# Patient Record
Sex: FEMALE | Race: BLACK OR AFRICAN AMERICAN | Employment: FULL TIME | URBAN - METROPOLITAN AREA
[De-identification: names, ages, dates, MRNs, and addresses within clinical notes are randomized per-mention and may not be internally consistent; named-entity substitution may affect disease eponyms.]

---

## 2017-09-10 ENCOUNTER — HOSPITAL ENCOUNTER (EMERGENCY)
Age: 27
Discharge: HOME | End: 2017-09-10
Attending: EMERGENCY MEDICINE | Admitting: EMERGENCY MEDICINE
Payer: SELF-PAY

## 2017-09-10 VITALS
TEMPERATURE: 98.4 F | WEIGHT: 270 LBS | BODY MASS INDEX: 49.69 KG/M2 | SYSTOLIC BLOOD PRESSURE: 181 MMHG | DIASTOLIC BLOOD PRESSURE: 99 MMHG | OXYGEN SATURATION: 99 % | HEART RATE: 84 BPM | HEIGHT: 62 IN | RESPIRATION RATE: 16 BRPM

## 2017-09-10 DIAGNOSIS — K08.89 DENTALGIA: ICD-10-CM

## 2017-09-10 DIAGNOSIS — R51.9 RIGHT FACIAL PAIN: Primary | ICD-10-CM

## 2017-09-10 PROCEDURE — 99282 EMERGENCY DEPT VISIT SF MDM: CPT

## 2017-09-10 RX ORDER — LEVETIRACETAM 250 MG/1
TABLET ORAL 2 TIMES DAILY
COMMUNITY
End: 2017-12-12

## 2017-09-10 RX ORDER — PENICILLIN V POTASSIUM 500 MG/1
500 TABLET, FILM COATED ORAL 4 TIMES DAILY
Qty: 28 TAB | Refills: 0 | Status: SHIPPED | OUTPATIENT
Start: 2017-09-10 | End: 2017-09-17

## 2017-09-10 RX ORDER — ACETAMINOPHEN AND CODEINE PHOSPHATE 300; 30 MG/1; MG/1
1 TABLET ORAL
Qty: 10 TAB | Refills: 0 | Status: SHIPPED | OUTPATIENT
Start: 2017-09-10 | End: 2017-12-12

## 2017-09-11 NOTE — ED NOTES
Pt discharged home stable and ambulatory. Pain level at discharge unchanged. Pt discharged with self. Reviewed discharged instructions with patient who verbalized understanding.   Patient armband removed and shredded

## 2017-09-11 NOTE — DISCHARGE INSTRUCTIONS
Tooth and Gum Pain: Care Instructions  Your Care Instructions    The most common causes of dental pain are tooth decay and gum disease. Pain can also be caused by an infection of the tooth (abscess) or the gums. Or you may have pain from a broken or cracked tooth. Other causes of pain include infection and damage to a tooth from nervous grinding of your teeth. A wisdom tooth can be painful when it is coming in but cannot break through the gum. It can also be painful when the tooth is only partway in and extra gum tissue has formed around it. The tissue can get inflamed (pericoronitis), and sometimes it gets infected. Prompt dental care can help find the cause of your toothache and keep the tooth from dying or gum disease from getting worse. Self-care at home may reduce your pain and discomfort. Follow-up care is a key part of your treatment and safety. Be sure to make and go to all appointments, and call your dentist or doctor if you are having problems. It's also a good idea to know your test results and keep a list of the medicines you take. How can you care for yourself at home? · To reduce pain and facial swelling, put an ice or cold pack on the outside of your cheek for 10 to 20 minutes at a time. Put a thin cloth between the ice and your skin. Do not use heat. · If your doctor prescribed antibiotics, take them as directed. Do not stop taking them just because you feel better. You need to take the full course of antibiotics. · Ask your doctor if you can take an over-the-counter pain medicine, such as acetaminophen (Tylenol), ibuprofen (Advil, Motrin), or naproxen (Aleve). Be safe with medicines. Read and follow all instructions on the label. · Avoid very hot, cold, or sweet foods and drinks if they increase your pain. · Rinse your mouth with warm salt water every 2 hours to help relieve pain and swelling. Mix 1 teaspoon of salt in 8 ounces of water.   · Talk to your dentist about using special toothpaste for sensitive teeth. To reduce pain on contact with heat or cold or when brushing, brush with this toothpaste regularly or rub a small amount of the paste on the sensitive area with a clean finger 2 or 3 times a day. Floss gently between your teeth. · Do not smoke or use spit tobacco. Tobacco use can make gum problems worse, decreases your ability to fight infection in your gums, and delays healing. If you need help quitting, talk to your doctor about stop-smoking programs and medicines. These can increase your chances of quitting for good. When should you call for help? Call 911 anytime you think you may need emergency care. For example, call if:  · You have trouble breathing. Call your dentist or doctor now or seek immediate medical care if:  · You have signs of infection, such as:  ¨ Increased pain, swelling, warmth, or redness. ¨ Red streaks leading from the area. ¨ Pus draining from the area. ¨ A fever. Watch closely for changes in your health, and be sure to contact your doctor if:  · You do not get better as expected. Where can you learn more? Go to http://baironTouchstormyamel.info/. Enter [id number] [id number] in the search box to learn more about \"Tooth and Gum Pain: Care Instructions. \"  Current as of: August 11, 2016  Content Version: 11.3  © 5408-5341 [employer]. Care instructions adapted under license by [employer] (which disclaims liability or warranty for this information). If you have questions about a medical condition or this instruction, always ask your healthcare professional. [place] [address] any warranty or liability for your use of this information. Learning About Dental Care  What is basic dental care? Basic dental care involves brushing and flossing your teeth regularly to remove plaque. Plaque is a thin film of bacteria that sticks to teeth above and below the gum line.  It can build up and harden into tartar, which makes it harder to give the teeth a good cleaning. Tartar usually has to be removed by a dental hygienist.  The bacteria in plaque use sugars to make acids. These acids can damage the gums and teeth. Be sure to see your dentist and dental hygienist for regular checkups and cleanings. How can dental care affect your health? Practicing basic dental care:  · Removes plaque that can cause gum disease and tooth decay. Tooth decay can lead to a hole in the tooth (cavity). · Helps prevent bad breath. Brushing and flossing rid your mouth of the bacteria that cause bad breath. · Helps keep teeth white by preventing staining from food, drinks, and tobacco.  · Makes it possible for your teeth to last a lifetime. What can you do to prevent dental problems? · Brush your teeth twice a day, in the morning and at night. ¨ Use a toothbrush with soft, rounded-end bristles and a head that is small enough to reach all parts of your teeth and mouth. ¨ Replace your toothbrush every 3 to 4 months. ¨ Use a fluoride toothpaste. ¨ Place the brush at a 45-degree angle where the teeth meet the gums. Press firmly, and gently rock the brush back and forth using small circular movements. ¨ Brush chewing surfaces vigorously with short back-and-forth strokes. ¨ Brush your tongue from back to front. · Floss at least once a day. Choose the type and flavor you like best.  · Schedule checkups and cleanings as often as your dentist recommends it. · Eat a healthy diet to help keep your gums healthy and your teeth strong. Choose foods that are good for your teeth, such as whole grains, vegetables, fruits, and foods that are low in saturated fat and sodium. Mozzarella and other cheeses, peanuts, yogurt, and milk are good for your teeth. Sugar-free chewing gum (especially gum that contains xylitol) is also a good choice. · Avoid foods that contain a lot of sugar, especially sticky, sweet foods like taffy. · Don't snack before bedtime.  Food left on the teeth is more likely to cause tooth decay overnight. · Don't smoke or use smokeless tobacco. Tobacco can make tooth decay worse. If you need help quitting, talk to your doctor about stop-smoking programs and medicines. These can increase your chances of quitting for good. Where can you learn more? Go to http://bairon-yamel.info/. Enter I503 in the search box to learn more about \"Learning About Dental Care. \"  Current as of: August 9, 2016  Content Version: 11.3  © 3821-9511 [employer]. Care instructions adapted under license by [employer] (which disclaims liability or warranty for this information). If you have questions about a medical condition or this instruction, always ask your healthcare professional. [address] any warranty or liability for your use of this information.

## 2017-09-11 NOTE — ED PROVIDER NOTES
HPI Comments:   11:03 PM    Nela Sena is a 32 y.o. female PMHx hypothyroidism, anemia presents to ED C/O upper right dental pain, onset a few weeks. Associated symptoms include swelling and headache. Taking ibuprofen 600 mg and tylenol 1000 mg for pain. Pt endorses smoking. Pt states allergy to hydrocodone is nausea. Pt did not take blood pressure medication today be denies chest pain and SOB. Pt denies fevers and any other Sx or complaints. Able to swallow and open mouth. The history is provided by the patient. No  was used. Past Medical History:   Diagnosis Date    Anemia     Headache     Hypertension     Hypothyroidism     Seizure [facility])        Past Surgical History:   Procedure Laterality Date    HX ORTHOPAEDIC      right meniscus repair         History reviewed. No pertinent family history. Social History     Social History    Marital status:      Spouse name: N/A    Number of children: N/A    Years of education: N/A     Occupational History    Not on file. Social History Main Topics    Smoking status: Current Every Day Smoker     Packs/day: 1.00    Smokeless tobacco: Not on file    Alcohol use No    Drug use: Not on file    Sexual activity: Yes     Birth control/ protection: None     Other Topics Concern    Not on file     Social History Narrative         ALLERGIES: Hydrochlorothiazide and Tramadol    Review of Systems   Constitutional: Negative for fever. HENT: Positive for dental problem. Respiratory: Negative for shortness of breath. Cardiovascular: Negative for chest pain. Neurological: Positive for headaches (secondary to dental pain). All other systems reviewed and are negative.       Vitals:    09/10/17 2137 09/10/17 2324   BP: (!) 173/121 (!) 181/99   Pulse: 98 84   Resp: 14 16   Temp: 98.4 °F (36.9 °C)    SpO2: 100% 99%   Weight: 122.5 kg (270 lb)    Height: 5' 2\" (1.575 m)             Physical Exam   Constitutional: She is oriented to person, place, and time. She appears well-developed and well-nourished. No distress. Alert, non toxic, appears slightly uncomfortable   HENT:   Head: Normocephalic and atraumatic. Right Ear: Tympanic membrane, external ear and ear canal normal.   Left Ear: Tympanic membrane, external ear and ear canal normal.   Nose: Nose normal.   Mouth/Throat: Uvula is midline, oropharynx is clear and moist and mucous membranes are normal. Mucous membranes are not dry. Abnormal dentition. Dental caries present. No dental abscesses. No oropharyngeal exudate, posterior oropharyngeal edema, posterior oropharyngeal erythema or tonsillar abscesses. No sublingual tenderness or swelling  no swelling, erythema, induration, fluctuance to the surrounding gingiva, no drainage  Able to swallow secretions  Able to open mouth fully  No trismus   No obvious facial swelling    Neck: Normal range of motion. Neck supple. Cardiovascular: Normal rate, regular rhythm and normal heart sounds. Pulmonary/Chest: Effort normal and breath sounds normal. No stridor. No respiratory distress. She has no wheezes. She has no rales. Lymphadenopathy:     She has no cervical adenopathy. Neurological: She is alert and oriented to person, place, and time. Skin: Skin is warm and dry. She is not diaphoretic. Psychiatric: She has a normal mood and affect. Judgment normal.   Nursing note and vitals reviewed. RESULTS:    PULSE OXIMETRY NOTE:  Pulse-ox is 100% on room air  Interpretation: normal       No orders to display        Labs Reviewed - No data to display    No results found for this or any previous visit (from the past 12 hour(s)).     MDM  Number of Diagnoses or Management Options  Dentalgia:   Right facial pain:   Diagnosis management comments: Fox Granado, no evidence of abscess  + hx of HTN, has not taken her BP meds today, has rx at home, denies CP, SOB, HA, blurred vision     ED Course     Medications - No data to display    Procedures    PROGRESS NOTE:  11:03 PM  Initial assessment performed. Written by Pricilla King ED Scribe, as dictated by Britt Blackman PA-C     DISCHARGE NOTE:  11:16PM  Kavya Ribeiro's  results have been reviewed with her. She has been counseled regarding her diagnosis, treatment, and plan. She verbally conveys understanding and agreement of the signs, symptoms, diagnosis, treatment and prognosis and additionally agrees to follow up as discussed. She also agrees with the care-plan and conveys that all of her questions have been answered. I have also provided discharge instructions for her that include: educational information regarding their diagnosis and treatment, and list of reasons why they would want to return to the ED prior to their follow-up appointment, should her condition change. Proper ED utilization discussed with the pt. CLINICAL IMPRESSION:    1. Right facial pain    2. Dentalgia        PLAN: DISCHARGE HOME    Follow-up Information     Follow up With Details Comments [id number] Call in 1 day  [address] [place] [facility] [address] [place] [address] EMERGENCY DEPT Go to As needed, If symptoms worsen [address] [place] [id number]  [phone]          Discharge Medication List as of 9/10/2017 11:16 PM      START taking these medications    Details   penicillin v potassium (VEETID) 500 mg tablet Take 1 Tab by mouth four (4) times daily for 7 days. , Print, Disp-28 Tab, R-0      acetaminophen-codeine (TYLENOL-CODEINE #3) 300-30 mg per tablet Take 1 Tab by mouth every four (4) hours as needed for Pain. Max Daily Amount: 6 Tabs., Print, Disp-10 Tab, R-0         CONTINUE these medications which have NOT CHANGED    Details   levETIRAcetam (KEPPRA) 250 mg tablet Take  by mouth two (2) times a day., Historical Med      propranolol LA (INDERAL LA) 80 mg SR capsule Take 40 mg by mouth two (2) times a day. , Historical Med      levothyroxine (SYNTHROID) 200 mcg tablet Take  by mouth Daily (before breakfast). , Historical Med      ferrous sulfate (IRON) 325 mg (65 mg iron) tablet Take  by mouth Daily (before breakfast). , Historical Med             ATTESTATIONS:  This note is prepared by Navid Kaufman, acting as Scribe for Alda Alvarado PA-C. Alda Alvarado PA-C: The scribe's documentation has been prepared under my direction and personally reviewed by me in its entirety. I confirm that the note above accurately reflects all work, treatment, procedures, and medical decision making performed by me.

## 2017-09-11 NOTE — ED TRIAGE NOTES
Pain in right upper jaw for a few weeks. Does not have dentist or dental insurance. Pt BP elevated at triage. Did not take HTN medication today.

## 2017-10-13 ENCOUNTER — HOSPITAL ENCOUNTER (EMERGENCY)
Age: 27
Discharge: HOME | End: 2017-10-13
Attending: EMERGENCY MEDICINE
Payer: SELF-PAY

## 2017-10-13 VITALS
HEART RATE: 104 BPM | TEMPERATURE: 98.2 F | WEIGHT: 270 LBS | HEIGHT: 62 IN | SYSTOLIC BLOOD PRESSURE: 156 MMHG | OXYGEN SATURATION: 100 % | DIASTOLIC BLOOD PRESSURE: 108 MMHG | BODY MASS INDEX: 49.69 KG/M2 | RESPIRATION RATE: 20 BRPM

## 2017-10-13 DIAGNOSIS — K08.89 PAIN, DENTAL: ICD-10-CM

## 2017-10-13 DIAGNOSIS — R51.9 RIGHT FACIAL PAIN: Primary | ICD-10-CM

## 2017-10-13 PROCEDURE — 99281 EMR DPT VST MAYX REQ PHY/QHP: CPT

## 2017-10-13 RX ORDER — IBUPROFEN 800 MG/1
800 TABLET ORAL
Qty: 20 TAB | Refills: 0 | Status: SHIPPED | OUTPATIENT
Start: 2017-10-13 | End: 2017-10-20

## 2017-10-13 NOTE — DISCHARGE INSTRUCTIONS
Dental Pain: After Your Visit  Your Care Instructions  The most common cause of dental pain is tooth decay. It can also be caused by an infection of the tooth (abscess) or gum, a tooth that has not broken all the way through the gum (impacted tooth), or a problem with the nerve-filled center of the tooth. Follow-up care is a key part of your treatment and safety. Be sure to make and go to all appointments, and call your doctor if you are having problems. Its also a good idea to know your test results and keep a list of the medicines you take. How can you care for yourself at home? · Contact a dentist for follow-up care. · Put ice or a cold pack on the outside of your mouth for 10 to 20 minutes at a time to reduce pain and swelling. Put a thin cloth between the ice and your skin. · Take an over-the-counter pain medicine, such as acetaminophen (Tylenol), ibuprofen (Advil, Motrin), or naproxen (Aleve). Read and follow all instructions on the label. · Do not take two or more pain medicines at the same time unless the doctor told you to. Many pain medicines have acetaminophen, which is Tylenol. Too much acetaminophen (Tylenol) can be harmful. · Rinse your mouth with warm salt water every 2 hours to help relieve pain and swelling from an infected tooth. Mix 1 teaspoon of salt in 8 ounces of water. · If your doctor prescribed antibiotics, take them as directed. Do not stop taking them just because you feel better. You need to take the full course of antibiotics. When should you call for help? Call your doctor now or seek immediate medical care if:  · You have signs of infection, such as:  ¨ Increased pain, swelling, warmth, or redness. ¨ Pus draining from the gum, tooth, or face. ¨ A fever. Watch closely for changes in your health, and be sure to contact your doctor if:  · You do not get better as expected. Where can you learn more?    Go to [employer]  Enter P964 in the search box to learn more about \"Dental Pain: After Your Visit. \"   © 7799-4845 [employer]. Care instructions adapted under license by Roxana Poon (which disclaims liability or warranty for this information). This care instruction is for use with your licensed healthcare professional. If you have questions about a medical condition or this instruction, always ask your healthcare professional. [address]  any warranty or liability for your use of this information. Content Version: 57.9.739143;  Last Revised: May 17, 2013

## 2017-10-13 NOTE — ED TRIAGE NOTES
Patient with complaints of right upper dental pain for over 1 week. Patient states that she has not taken her bp meds today.

## 2017-10-13 NOTE — ED PROVIDER NOTES
HPI Comments: 2:57 PM  Joshua Jerry is a 32 y.o. female with PMhx of HTN, seizure disorder, hypothyroidism and anemia presenting to the ED C/O upper right dental pain onset 3-4 weeks ago. No associated sxs. Pain is exacerbated when eating and drinking. Pt reports use of Tylenol without relief. Pt states she cannot get a dental appointment until November. Pt was seen at this facility 2 days ago for same, d'xed with dentalgia, and prescribed Keppra. Pt reports she stopped taking her antibiotics because they \"gave her a yeast infection. \" Pt denies fever, chills, and any other symptoms or complaints at this time. The history is provided by the patient. No  was used. Past Medical History:   Diagnosis Date    Anemia     Headache(784.0)     Hypertension     Hypothyroidism     Seizure ([address]        Past Surgical History:   Procedure Laterality Date    HX ORTHOPAEDIC      right meniscus repair         History reviewed. No pertinent family history. Social History     Social History    Marital status:      Spouse name: N/A    Number of children: N/A    Years of education: N/A     Occupational History    Not on file. Social History Main Topics    Smoking status: Current Every Day Smoker     Packs/day: 1.00    Smokeless tobacco: Never Used    Alcohol use No    Drug use: Not on file    Sexual activity: Yes     Birth control/ protection: None     Other Topics Concern    Not on file     Social History Narrative         ALLERGIES: Hydrochlorothiazide and Tramadol    Review of Systems   Constitutional: Negative for chills and fever. HENT: Positive for dental problem (upper right). Negative for mouth sores. All other systems reviewed and are negative.       Vitals:    10/13/17 1434   BP: (!) 156/108   Pulse: (!) 104   Resp: 20   Temp: 98.2 °F (36.8 °C)   SpO2: 100%   Weight: 122.5 kg (270 lb)   Height: 5' 2\" (1.575 m)            Physical Exam   Constitutional: She is oriented to person, place, and time. She appears well-developed and well-nourished. No distress. Alert, well appearing, non toxic    HENT:   Head: Normocephalic and atraumatic. Right Ear: Tympanic membrane, external ear and ear canal normal.   Left Ear: Tympanic membrane, external ear and ear canal normal.   Nose: Nose normal.   Mouth/Throat: Uvula is midline, oropharynx is clear and moist and mucous membranes are normal. Mucous membranes are not dry. Abnormal dentition. No oropharyngeal exudate, posterior oropharyngeal edema, posterior oropharyngeal erythema or tonsillar abscesses. No sublingual tenderness or swelling  no swelling, erythema, induration, fluctuance to the surrounding gingiva, no drainage  Able to swallow secretions  Able to open mouth fully  No trismus   No obvious facial swelling    Neck: Normal range of motion. Neck supple. Cardiovascular: Normal rate, regular rhythm, normal heart sounds and intact distal pulses. No murmur heard. Pulmonary/Chest: Effort normal and breath sounds normal. No stridor. No respiratory distress. She has no wheezes. She has no rales. Lymphadenopathy:     She has no cervical adenopathy. Neurological: She is alert and oriented to person, place, and time. Skin: Skin is warm and dry. She is not diaphoretic. Psychiatric: She has a normal mood and affect. Judgment normal.   Nursing note and vitals reviewed. RESULTS:    PULSE OXIMETRY NOTE:  Pulse-ox is 100% on RA  Interpretation: Normal       No orders to display        Labs Reviewed - No data to display    No results found for this or any previous visit (from the past 12 hour(s)).     MDM  Number of Diagnoses or Management Options  Pain, dental:   Right facial pain:   Diagnosis management comments: No evidence of infection will have pt f/u with dentist   Recently treated with abx for dental pain     ED Course     MEDICATIONS GIVEN:  Medications - No data to display    Procedures    PROGRESS NOTE:  2:57 PM  Initial assessment performed. Recorded by Terra Araiza ED Scribe, as dictated by Bailee Viera PA-C. Discharge Note:  3:12 PM  Angel Luis Ribeiro's results have been reviewed with her and/or her family. She has been counseled regarding her diagnosis, treatment, and plan. She verbally conveys understanding and agreement of the signs, symptoms, diagnosis, treatment and prognosis and additionally agrees to follow up as discussed. She also agrees with the care-plan and conveys that all of her questions have been answered. I have also provided discharge instructions for her that include: educational information regarding the diagnosis and treatment, and a list of reasons why She would want to return to the ED prior to her follow-up appointment, should her condition change. Proper ED utilization discussed with the patient. CLINICAL IMPRESSION    1. Right facial pain    2. Pain, dental        After visit plan:  D/c home    Discharge Medication List as of 10/13/2017  3:12 PM      START taking these medications    Details   ibuprofen (MOTRIN) 800 mg tablet Take 1 Tab by mouth every six (6) hours as needed for Pain for up to 7 days. , Print, Disp-20 Tab, R-0         CONTINUE these medications which have NOT CHANGED    Details   levETIRAcetam (KEPPRA) 250 mg tablet Take  by mouth two (2) times a day., Historical Med      acetaminophen-codeine (TYLENOL-CODEINE #3) 300-30 mg per tablet Take 1 Tab by mouth every four (4) hours as needed for Pain. Max Daily Amount: 6 Tabs., Print, Disp-10 Tab, R-0      propranolol LA (INDERAL LA) 80 mg SR capsule Take 40 mg by mouth two (2) times a day., Historical Med      levothyroxine (SYNTHROID) 200 mcg tablet Take  by mouth Daily (before breakfast). , Historical Med      ferrous sulfate (IRON) 325 mg (65 mg iron) tablet Take  by mouth Daily (before breakfast). , Historical Med             Follow-up Information     Follow up With Details Comments Contact Info    Dentist Go to Go to a dentist for follow up. [facility] EMERGENCY DEPT Go to As needed, If symptoms worsen [address] [place] [address]  [phone]          ____________________    This note is prepared by Opal Whiteside, acting as Scribe for Maria Del Carmen Solo PA-C. Maria Del Carmen Solo PA-C: The scribe's documentation has been prepared under my direction and personally reviewed by me in its entirety. I confirm that the note above accurately reflects all work, treatment, procedures, and medical decision making performed by me.

## 2017-10-13 NOTE — ED NOTES
Upon attempting to discharge patient. Patient not in room after discussion with provider. Patient left without discharge instructions.

## 2017-12-12 ENCOUNTER — HOSPITAL ENCOUNTER (EMERGENCY)
Age: 27
Discharge: HOME | End: 2017-12-12
Attending: EMERGENCY MEDICINE
Payer: SELF-PAY

## 2017-12-12 ENCOUNTER — APPOINTMENT (OUTPATIENT)
Dept: CT IMAGING | Age: 27
End: 2017-12-12
Attending: PHYSICIAN ASSISTANT
Payer: SELF-PAY

## 2017-12-12 VITALS
HEIGHT: 62 IN | SYSTOLIC BLOOD PRESSURE: 154 MMHG | HEART RATE: 74 BPM | BODY MASS INDEX: 49.69 KG/M2 | RESPIRATION RATE: 18 BRPM | TEMPERATURE: 99.1 F | DIASTOLIC BLOOD PRESSURE: 113 MMHG | WEIGHT: 270 LBS | OXYGEN SATURATION: 100 %

## 2017-12-12 DIAGNOSIS — R10.9 RIGHT FLANK PAIN: Primary | ICD-10-CM

## 2017-12-12 DIAGNOSIS — R31.9 HEMATURIA, UNSPECIFIED TYPE: ICD-10-CM

## 2017-12-12 LAB
ALBUMIN SERPL-MCNC: 3.8 G/DL (ref 3.4–5)
ALBUMIN/GLOB SERPL: 0.8 {RATIO} (ref 0.8–1.7)
ALP SERPL-CCNC: 80 U/L (ref 45–117)
ALT SERPL-CCNC: 27 U/L (ref 13–56)
ANION GAP SERPL CALC-SCNC: 8 MMOL/L (ref 3–18)
APPEARANCE UR: ABNORMAL
AST SERPL-CCNC: 10 U/L (ref 15–37)
BACTERIA URNS QL MICRO: ABNORMAL /HPF
BASOPHILS # BLD: 0 K/UL (ref 0–0.06)
BASOPHILS NFR BLD: 0 % (ref 0–2)
BILIRUB SERPL-MCNC: 0.1 MG/DL (ref 0.2–1)
BILIRUB UR QL: NEGATIVE
BUN SERPL-MCNC: 14 MG/DL (ref 7–18)
BUN/CREAT SERPL: 15 (ref 12–20)
CALCIUM SERPL-MCNC: 9.2 MG/DL (ref 8.5–10.1)
CHLORIDE SERPL-SCNC: 104 MMOL/L (ref 100–108)
CO2 SERPL-SCNC: 29 MMOL/L (ref 21–32)
COLOR UR: YELLOW
CREAT SERPL-MCNC: 0.91 MG/DL (ref 0.6–1.3)
DIFFERENTIAL METHOD BLD: ABNORMAL
EOSINOPHIL # BLD: 0.2 K/UL (ref 0–0.4)
EOSINOPHIL NFR BLD: 3 % (ref 0–5)
EPITH CASTS URNS QL MICRO: ABNORMAL /LPF (ref 0–5)
ERYTHROCYTE [DISTWIDTH] IN BLOOD BY AUTOMATED COUNT: 13.8 % (ref 11.6–14.5)
GLOBULIN SER CALC-MCNC: 4.6 G/DL (ref 2–4)
GLUCOSE SERPL-MCNC: 97 MG/DL (ref 74–99)
GLUCOSE UR STRIP.AUTO-MCNC: NEGATIVE MG/DL
HCG UR QL: NEGATIVE
HCT VFR BLD AUTO: 35.7 % (ref 35–45)
HGB BLD-MCNC: 11.8 G/DL (ref 12–16)
HGB UR QL STRIP: ABNORMAL
KETONES UR QL STRIP.AUTO: NEGATIVE MG/DL
LEUKOCYTE ESTERASE UR QL STRIP.AUTO: NEGATIVE
LYMPHOCYTES # BLD: 2.2 K/UL (ref 0.9–3.6)
LYMPHOCYTES NFR BLD: 27 % (ref 21–52)
MCH RBC QN AUTO: 29.6 PG (ref 24–34)
MCHC RBC AUTO-ENTMCNC: 33.1 G/DL (ref 31–37)
MCV RBC AUTO: 89.7 FL (ref 74–97)
MONOCYTES # BLD: 0.6 K/UL (ref 0.05–1.2)
MONOCYTES NFR BLD: 8 % (ref 3–10)
NEUTS SEG # BLD: 5.1 K/UL (ref 1.8–8)
NEUTS SEG NFR BLD: 62 % (ref 40–73)
NITRITE UR QL STRIP.AUTO: NEGATIVE
PH UR STRIP: 6.5 [PH] (ref 5–8)
PLATELET # BLD AUTO: 291 K/UL (ref 135–420)
PMV BLD AUTO: 10.7 FL (ref 9.2–11.8)
POTASSIUM SERPL-SCNC: 3.7 MMOL/L (ref 3.5–5.5)
PROT SERPL-MCNC: 8.4 G/DL (ref 6.4–8.2)
PROT UR STRIP-MCNC: ABNORMAL MG/DL
RBC # BLD AUTO: 3.98 M/UL (ref 4.2–5.3)
RBC #/AREA URNS HPF: ABNORMAL /HPF (ref 0–5)
SODIUM SERPL-SCNC: 141 MMOL/L (ref 136–145)
SP GR UR REFRACTOMETRY: 1.02 (ref 1–1.03)
UROBILINOGEN UR QL STRIP.AUTO: 0.2 EU/DL (ref 0.2–1)
WBC # BLD AUTO: 8.2 K/UL (ref 4.6–13.2)
WBC URNS QL MICRO: ABNORMAL /HPF (ref 0–5)

## 2017-12-12 PROCEDURE — 74011250636 HC RX REV CODE- 250/636: Performed by: PHYSICIAN ASSISTANT

## 2017-12-12 PROCEDURE — 96374 THER/PROPH/DIAG INJ IV PUSH: CPT

## 2017-12-12 PROCEDURE — 96375 TX/PRO/DX INJ NEW DRUG ADDON: CPT

## 2017-12-12 PROCEDURE — 99283 EMERGENCY DEPT VISIT LOW MDM: CPT

## 2017-12-12 PROCEDURE — 85025 COMPLETE CBC W/AUTO DIFF WBC: CPT | Performed by: PHYSICIAN ASSISTANT

## 2017-12-12 PROCEDURE — 81001 URINALYSIS AUTO W/SCOPE: CPT | Performed by: EMERGENCY MEDICINE

## 2017-12-12 PROCEDURE — 81025 URINE PREGNANCY TEST: CPT | Performed by: PHYSICIAN ASSISTANT

## 2017-12-12 PROCEDURE — 80053 COMPREHEN METABOLIC PANEL: CPT | Performed by: PHYSICIAN ASSISTANT

## 2017-12-12 PROCEDURE — 74176 CT ABD & PELVIS W/O CONTRAST: CPT

## 2017-12-12 RX ORDER — ONDANSETRON 2 MG/ML
4 INJECTION INTRAMUSCULAR; INTRAVENOUS
Status: COMPLETED | OUTPATIENT
Start: 2017-12-12 | End: 2017-12-12

## 2017-12-12 RX ORDER — ONDANSETRON 4 MG/1
4 TABLET, FILM COATED ORAL
Qty: 15 TAB | Refills: 0 | Status: SHIPPED | OUTPATIENT
Start: 2017-12-12 | End: 2018-04-14

## 2017-12-12 RX ORDER — CHOLECALCIFEROL (VITAMIN D3) 125 MCG
CAPSULE ORAL
COMMUNITY

## 2017-12-12 RX ORDER — KETOROLAC TROMETHAMINE 10 MG/1
10 TABLET, FILM COATED ORAL
Qty: 20 TAB | Refills: 0 | Status: SHIPPED | OUTPATIENT
Start: 2017-12-12 | End: 2018-04-14

## 2017-12-12 RX ORDER — KETOROLAC TROMETHAMINE 30 MG/ML
30 INJECTION, SOLUTION INTRAMUSCULAR; INTRAVENOUS
Status: COMPLETED | OUTPATIENT
Start: 2017-12-12 | End: 2017-12-12

## 2017-12-12 RX ADMIN — KETOROLAC TROMETHAMINE 30 MG: 30 INJECTION, SOLUTION INTRAMUSCULAR at 22:02

## 2017-12-12 RX ADMIN — ONDANSETRON 4 MG: 2 INJECTION INTRAMUSCULAR; INTRAVENOUS at 22:02

## 2017-12-13 NOTE — ED TRIAGE NOTES
Bilateral flank pain and vomiting with blood in urine. Began this am. Sepsis Screening completed    (  )Patient meets SIRS criteria. ( x )Patient does not meet SIRS criteria.       SIRS Criteria is achieved when two or more of the following are present   Temperature < 96.8°F (36°C) or > 100.9°F (38.3°C)   Heart Rate > 90 beats per minute   Respiratory Rate > 20 breaths per minute   WBC count > 12,000 or <4,000 or > 10% bands

## 2017-12-13 NOTE — ED PROVIDER NOTES
EMERGENCY DEPARTMENT HISTORY AND PHYSICAL EXAM    Date: 12/12/2017  Patient Name: John Ribeiro    History of Presenting Illness     Chief Complaint   Patient presents with    Flank Pain         History Provided By: Patient    Chief Complaint: bilateral flank pain  Duration: 12 Hours  Timing:  Gradual  Location: bilateral flank  Associated Symptoms: decreased urination and dysuria    Additional History (Context):   9:31 PM  Maryse Harris is a 32 y.o. female with PMHX of kidney stones in 2013 who presents to the emergency department C/O bilateral flank pain onset this morning. Associated sxs include decreased urination and dysuria. Pt reports this is similar to the pain she has when she had kidney stones. LBM was yesterday. LMP was 1 week ago. Pt took ibuprofen for pain relief. Pt denies diarrhea, vomiting, fever, vaginal discharge, and any other sxs or complaints. PCP: Gladys Gusman MD    Current Outpatient Prescriptions   Medication Sig Dispense Refill    cholecalciferol, vitamin D3, (VITAMIN D3) 2,000 unit tab Take  by mouth.  ondansetron hcl (ZOFRAN, AS HYDROCHLORIDE,) 4 mg tablet Take 1 Tab by mouth every eight (8) hours as needed for Nausea. 15 Tab 0    ketorolac (TORADOL) 10 mg tablet Take 1 Tab by mouth every six (6) hours as needed for Pain. 20 Tab 0    propranolol LA (INDERAL LA) 80 mg SR capsule Take 40 mg by mouth two (2) times a day.  levothyroxine (SYNTHROID) 200 mcg tablet Take  by mouth Daily (before breakfast).  ferrous sulfate (IRON) 325 mg (65 mg iron) tablet Take  by mouth Daily (before breakfast). Past History     Past Medical History:  Past Medical History:   Diagnosis Date    Anemia     Headache(784.0)     Hypertension     Hypothyroidism     Seizure ([address]        Past Surgical History:  Past Surgical History:   Procedure Laterality Date    HX ORTHOPAEDIC      right meniscus repair       Family History:  History reviewed.  No pertinent family history. Social History:  Social History   Substance Use Topics    Smoking status: Current Every Day Smoker     Packs/day: 1.00    Smokeless tobacco: Never Used    Alcohol use No       Allergies: Allergies   Allergen Reactions    Hydrochlorothiazide Swelling    Tramadol Rash         Review of Systems   Review of Systems   Constitutional: Negative for fever. Gastrointestinal: Negative for diarrhea and vomiting. Genitourinary: Positive for decreased urine volume, dysuria and flank pain. Negative for vaginal discharge. All other systems reviewed and are negative. Physical Exam     Vitals:    12/12/17 2014 12/12/17 2245   BP: (!) 196/132 (!) 154/113   Pulse: 94 74   Resp: 16 18   Temp: 99.1 °F (37.3 °C)    SpO2: 100% 100%   Weight: 122.5 kg (270 lb)    Height: 5' 2\" (1.575 m)      Physical Exam   Constitutional: She is oriented to person, place, and time. She appears well-developed and well-nourished. Alert, obese, non toxic   HENT:   Head: Normocephalic and atraumatic. Neck: Normal range of motion. Neck supple. Cardiovascular: Normal rate, regular rhythm, normal heart sounds and intact distal pulses. No murmur heard. Pulmonary/Chest: Effort normal and breath sounds normal. No respiratory distress. She has no wheezes. She has no rales. Abdominal: Soft. Bowel sounds are normal. She exhibits no distension. There is no hepatosplenomegaly. There is tenderness. There is CVA tenderness (right ). There is no rigidity, no rebound and no guarding. Neurological: She is alert and oriented to person, place, and time. Skin: Skin is warm and dry. No rash noted. No erythema. No pallor. Psychiatric: She has a normal mood and affect. Judgment normal.   Nursing note and vitals reviewed.         Diagnostic Study Results     Labs -     Recent Results (from the past 12 hour(s))   URINALYSIS W/ RFLX MICROSCOPIC    Collection Time: 12/12/17  8:40 PM   Result Value Ref Range    Color YELLOW      Appearance CLOUDY      Specific gravity 1.022 1.005 - 1.030      pH (UA) 6.5 5.0 - 8.0      Protein TRACE (A) NEG mg/dL    Glucose NEGATIVE  NEG mg/dL    Ketone NEGATIVE  NEG mg/dL    Bilirubin NEGATIVE  NEG      Blood LARGE (A) NEG      Urobilinogen 0.2 0.2 - 1.0 EU/dL    Nitrites NEGATIVE  NEG      Leukocyte Esterase NEGATIVE  NEG     HCG URINE, QL    Collection Time: 12/12/17  8:40 PM   Result Value Ref Range    HCG urine, Ql. NEGATIVE  NEG     URINE MICROSCOPIC ONLY    Collection Time: 12/12/17  8:40 PM   Result Value Ref Range    WBC 0 to 3 0 - 5 /hpf    RBC 61 to 70 0 - 5 /hpf    Epithelial cells 1+ 0 - 5 /lpf    Bacteria 1+ (A) NEG /hpf   CBC WITH AUTOMATED DIFF    Collection Time: 12/12/17  9:40 PM   Result Value Ref Range    WBC 8.2 4.6 - 13.2 K/uL    RBC 3.98 (L) 4.20 - 5.30 M/uL    HGB 11.8 (L) 12.0 - 16.0 g/dL    HCT 35.7 35.0 - 45.0 %    MCV 89.7 74.0 - 97.0 FL    MCH 29.6 24.0 - 34.0 PG    MCHC 33.1 31.0 - 37.0 g/dL    RDW 13.8 11.6 - 14.5 %    PLATELET 347 109 - 197 K/uL    MPV 10.7 9.2 - 11.8 FL    NEUTROPHILS 62 40 - 73 %    LYMPHOCYTES 27 21 - 52 %    MONOCYTES 8 3 - 10 %    EOSINOPHILS 3 0 - 5 %    BASOPHILS 0 0 - 2 %    ABS. NEUTROPHILS 5.1 1.8 - 8.0 K/UL    ABS. LYMPHOCYTES 2.2 0.9 - 3.6 K/UL    ABS. MONOCYTES 0.6 0.05 - 1.2 K/UL    ABS. EOSINOPHILS 0.2 0.0 - 0.4 K/UL    ABS.  BASOPHILS 0.0 0.0 - 0.06 K/UL    DF AUTOMATED     METABOLIC PANEL, COMPREHENSIVE    Collection Time: 12/12/17  9:40 PM   Result Value Ref Range    Sodium 141 136 - 145 mmol/L    Potassium 3.7 3.5 - 5.5 mmol/L    Chloride 104 100 - 108 mmol/L    CO2 29 21 - 32 mmol/L    Anion gap 8 3.0 - 18 mmol/L    Glucose 97 74 - 99 mg/dL    BUN 14 7.0 - 18 MG/DL    Creatinine 0.91 0.6 - 1.3 MG/DL    BUN/Creatinine ratio 15 12 - 20      GFR est AA >60 >60 ml/min/1.73m2    GFR est non-AA >60 >60 ml/min/1.73m2    Calcium 9.2 8.5 - 10.1 MG/DL    Bilirubin, total 0.1 (L) 0.2 - 1.0 MG/DL    ALT (SGPT) 27 13 - 56 U/L    AST (SGOT) 10 (L) 15 - 37 U/L Alk. phosphatase 80 45 - 117 U/L    Protein, total 8.4 (H) 6.4 - 8.2 g/dL    Albumin 3.8 3.4 - 5.0 g/dL    Globulin 4.6 (H) 2.0 - 4.0 g/dL    A-G Ratio 0.8 0.8 - 1.7         Radiologic Studies -     CT Results  (Last 48 hours)               12/12/17 2215  CT ABD PELV WO CONT Final result    Impression:  IMPRESSION:       No acute intra-abdominal pathology identified. Narrative:  EXAM: CT of the abdomen and pelvis       INDICATION: Right flank pain. COMPARISON: None. TECHNIQUE: Axial CT imaging of the abdomen and pelvis was performed without   intravenous contrast. Multiplanar reformats were generated. One or more dose reduction techniques were used on this CT: automated exposure   control, adjustment of the mAs and/or kVp according to patient's size, and   iterative reconstruction techniques. The specific techniques utilized on this CT   exam have been documented in the patient's electronic medical record.       _______________       FINDINGS: Please note that lack of IV contrast limits detailed evaluation of the   solid organs, bowel, and retroperitoneum for subtle pathology. LOWER CHEST: Unremarkable. LIVER, BILIARY: Liver is normal. No biliary dilation. Cholecystectomy. Ethelene Gauss PANCREAS: Normal.       SPLEEN: Normal.       ADRENALS: Normal.       KIDNEYS/URETERS/BLADDER: Normal.       PELVIC ORGANS: Unremarkable. VASCULATURE: Unremarkable       LYMPH NODES: No enlarged lymph nodes. GASTROINTESTINAL TRACT: No bowel dilation or wall thickening. Normal appendix. BONES: No acute or aggressive osseous abnormalities identified.        OTHER: None.       _______________               CXR Results  (Last 48 hours)    None          Medications given in the ED-  Medications   ketorolac (TORADOL) injection 30 mg (30 mg IntraVENous Given 12/12/17 2202)   ondansetron (ZOFRAN) injection 4 mg (4 mg IntraVENous Given 12/12/17 2202)         Medical Decision Making   I am the first provider for this patient. I reviewed the vital signs, available nursing notes, past medical history, past surgical history, family history and social history. Vital Signs-Reviewed the patient's vital signs. Provider Notes (Medical Decision Making):     Procedures:  Procedures    ED Course:   9:31 PM Initial assessment performed. The patients presenting problems have been discussed, and they are in agreement with the care plan formulated and outlined with them. I have encouraged them to ask questions as they arise throughout their visit. Diagnosis and Disposition       DISCHARGE NOTE:  10:40 PM  Angel Luis Ribeiro's  results have been reviewed with her. She has been counseled regarding her diagnosis, treatment, and plan. She verbally conveys understanding and agreement of the signs, symptoms, diagnosis, treatment and prognosis and additionally agrees to follow up as discussed. She also agrees with the care-plan and conveys that all of her questions have been answered. I have also provided discharge instructions for her that include: educational information regarding their diagnosis and treatment, and list of reasons why they would want to return to the ED prior to their follow-up appointment, should her condition change. She has been provided with education for proper emergency department utilization. CLINICAL IMPRESSION:    1. Right flank pain    2. Hematuria, unspecified type        PLAN:  1. D/C Home  2. Discharge Medication List as of 12/12/2017 10:40 PM        3. Follow-up Information     Follow up With Details Comments Contact Info    Javid Alfonso MD Schedule an appointment as soon as possible for a visit in 2 days  [id number] [place] [address]      THE [facility] EMERGENCY DEPT  As needed, If symptoms worsen [address] [address]  [phone]        _______________________________    Attestations:   This note is prepared by Galilea Machuca acting as Scribe for Anna Gtz [place] [employer] . Anna Gtz PA-C:  The scribe's documentation has been prepared under my direction and personally reviewed by me in its entirety.   I confirm that the note above accurately reflects all work, treatment, procedures, and medical decision making performed by me.  _______________________________

## 2017-12-13 NOTE — ED NOTES
Presented to ED to be evaluated for reported right flank and RLQ pain with onset this a.m. Patient reports pain as documented. Patient also reports nausea, but no vomiting. Urinary urgency with noted decreased urinary output. Patient reports hx of kidney stones and reports similar to current symptoms. Patient denies any additional complaints at this time and is noted to be attempting to rest to positon of comfort.

## 2017-12-13 NOTE — DISCHARGE INSTRUCTIONS

## 2018-02-07 ENCOUNTER — HOSPITAL ENCOUNTER (EMERGENCY)
Age: 28
Discharge: HOME | End: 2018-02-07
Attending: EMERGENCY MEDICINE
Payer: SELF-PAY

## 2018-02-07 ENCOUNTER — APPOINTMENT (OUTPATIENT)
Dept: GENERAL RADIOLOGY | Age: 28
End: 2018-02-07
Attending: EMERGENCY MEDICINE
Payer: SELF-PAY

## 2018-02-07 VITALS
DIASTOLIC BLOOD PRESSURE: 104 MMHG | HEIGHT: 62 IN | OXYGEN SATURATION: 95 % | RESPIRATION RATE: 20 BRPM | SYSTOLIC BLOOD PRESSURE: 154 MMHG | WEIGHT: 270 LBS | BODY MASS INDEX: 49.69 KG/M2 | TEMPERATURE: 98.5 F | HEART RATE: 98 BPM

## 2018-02-07 DIAGNOSIS — J45.901 ASTHMA WITH ACUTE EXACERBATION IN ADULT, UNSPECIFIED ASTHMA SEVERITY, UNSPECIFIED WHETHER PERSISTENT: ICD-10-CM

## 2018-02-07 DIAGNOSIS — J20.9 ACUTE BRONCHITIS, UNSPECIFIED ORGANISM: Primary | ICD-10-CM

## 2018-02-07 LAB — HCG UR QL: NEGATIVE

## 2018-02-07 PROCEDURE — 93005 ELECTROCARDIOGRAM TRACING: CPT

## 2018-02-07 PROCEDURE — 81025 URINE PREGNANCY TEST: CPT

## 2018-02-07 PROCEDURE — 99283 EMERGENCY DEPT VISIT LOW MDM: CPT

## 2018-02-07 PROCEDURE — 71046 X-RAY EXAM CHEST 2 VIEWS: CPT

## 2018-02-07 RX ORDER — PROMETHAZINE HYDROCHLORIDE, PHENYLEPHRINE HYDROCHLORIDE AND CODEINE PHOSPHATE 6.25; 5; 1 MG/5ML; MG/5ML; MG/5ML
5 SOLUTION ORAL
Qty: 118 ML | Refills: 0 | Status: SHIPPED | OUTPATIENT
Start: 2018-02-07 | End: 2018-04-14

## 2018-02-07 RX ORDER — AZITHROMYCIN 250 MG/1
TABLET, FILM COATED ORAL
Qty: 6 TAB | Refills: 0 | Status: SHIPPED | OUTPATIENT
Start: 2018-02-07 | End: 2018-02-12

## 2018-02-07 RX ORDER — METHYLPREDNISOLONE 4 MG/1
TABLET ORAL
Qty: 1 DOSE PACK | Refills: 0 | Status: SHIPPED | OUTPATIENT
Start: 2018-02-07 | End: 2018-04-14

## 2018-02-07 NOTE — LETTER
[facility] [place] 
THE [facility] EMERGENCY DEPT 
[address] [id number] 
[phone] Work/School Note Date: 2/7/2018 To Whom It May concern: 
 
Nikolas Stanley was seen and treated today in the emergency room by the following provider(s): 
Attending Provider: Emerson Dolan MD 
Physician Assistant: Elis Gary, [address]. Nikolas Stanley may return to work on 2/9/18. Sincerely, Bell Castillo PA-C.

## 2018-02-08 NOTE — ED TRIAGE NOTES
Pt states cough, congestion x 1 week. Pt complains of chest pain and weakness x 4 days. Denies any fevers.

## 2018-02-08 NOTE — ED PROVIDER NOTES
EMERGENCY DEPARTMENT HISTORY AND PHYSICAL EXAM    Date: 2/7/2018  Patient Name: Sharita Ribeiro    History of Presenting Illness     Chief Complaint   Patient presents with    Cough    Chest Pain         History Provided By: Patient    Chief Complaint: cough  Duration: 3 Days  Timing:  Gradual  Location: chest  Associated Symptoms: CP and weakness    Additional History (Context):   9:51 PM  Maritza Sommers is a 32 y.o. female with PMHX of asthma who presents to the emergency department C/O cough for 5 days. Associated sxs include CP secondary to cough and fever. Pt denies SOB, using inhaler at home, and any other sxs or complaints. PCP: Abdoulaye Zuniga MD    Current Outpatient Prescriptions   Medication Sig Dispense Refill    azithromycin (ZITHROMAX Z-JEREMY) 250 mg tablet Take as package directs 6 Tab 0    methylPREDNISolone (MEDROL, JEREMY,) 4 mg tablet Take as package directs 1 Dose Pack 0    promethazine-phenyleph-codeine (PHENERGAN VC WITH CODEINE) 6.25-5-10 mg/5 mL syrp Take 5 mL by mouth every six (6) hours as needed. Max Daily Amount: 20 mL. 118 mL 0    cholecalciferol, vitamin D3, (VITAMIN D3) 2,000 unit tab Take  by mouth.  propranolol LA (INDERAL LA) 80 mg SR capsule Take 40 mg by mouth two (2) times a day.  levothyroxine (SYNTHROID) 200 mcg tablet Take 225 mcg by mouth Daily (before breakfast).  ferrous sulfate (IRON) 325 mg (65 mg iron) tablet Take  by mouth Daily (before breakfast).  ondansetron hcl (ZOFRAN, AS HYDROCHLORIDE,) 4 mg tablet Take 1 Tab by mouth every eight (8) hours as needed for Nausea. 15 Tab 0    ketorolac (TORADOL) 10 mg tablet Take 1 Tab by mouth every six (6) hours as needed for Pain.  20 Tab 0       Past History     Past Medical History:  Past Medical History:   Diagnosis Date    Anemia     Headache(784.0)     Hypertension     Hypothyroidism     Seizure ([address]        Past Surgical History:  Past Surgical History:   Procedure Laterality Date    HX ORTHOPAEDIC      right meniscus repair       Family History:  History reviewed. No pertinent family history. Social History:  Social History   Substance Use Topics    Smoking status: Current Every Day Smoker     Packs/day: 1.00    Smokeless tobacco: Never Used    Alcohol use No       Allergies: Allergies   Allergen Reactions    Hydrochlorothiazide Swelling    Tramadol Rash         Review of Systems   Review of Systems   Constitutional: Positive for fever. Negative for chills. HENT: Negative for congestion. Respiratory: Positive for cough. Negative for shortness of breath. Cardiovascular: Positive for chest pain. Neurological: Positive for weakness. All other systems reviewed and are negative. Physical Exam     Vitals:    02/07/18 2114   BP: (!) 154/104   Pulse: 98   Resp: 20   Temp: 98.5 °F (36.9 °C)   SpO2: 95%   Weight: 122.5 kg (270 lb)   Height: 5' 2\" (1.575 m)     Physical Exam   Constitutional: She is oriented to person, place, and time. Vital signs are normal. She appears well-developed and well-nourished. No distress. P well appearing, sitting up in chair, speaking in full, clear sentences, NAD. HENT:   Head: Normocephalic and atraumatic. Right Ear: External ear normal.   Left Ear: External ear normal.   Nose: Nose normal.   Mouth/Throat: Oropharynx is clear and moist. No oropharyngeal exudate. Eyes: Conjunctivae and EOM are normal. Pupils are equal, round, and reactive to light. Neck: Normal range of motion. Neck supple. Cardiovascular: Normal rate, regular rhythm and normal heart sounds. Pulmonary/Chest: Effort normal and breath sounds normal. No respiratory distress. She has no wheezes. She has no rales. She exhibits no tenderness. Musculoskeletal: Normal range of motion. Neurological: She is alert and oriented to person, place, and time. Skin: Skin is warm and dry. She is not diaphoretic. Psychiatric: She has a normal mood and affect.  Her behavior is normal.   Nursing note and vitals reviewed. Diagnostic Study Results     Labs -     No results found for this or any previous visit (from the past 12 hour(s)). Radiologic Studies -   9:53 PM  RADIOLOGY FINDINGS  chest X-ray shows NAP  Pending review by Radiologist  Recorded by Primitivo Huitron ED Scribe, as dictated by Vero Sanchez PA-C.   XR CHEST PA LAT   Final Result        CT Results  (Last 48 hours)    None        CXR Results  (Last 48 hours)               02/07/18 2146  XR CHEST PA LAT Final result    Impression:  IMPRESSION:   --------------       No active cardiopulmonary disease. Narrative:  CLINICAL HISTORY:  Chest pain. Cough. COMPARISON EXAMINATIONS:  January 1, 2015. ---  CHEST PA AND LATERAL  ---       The cardiomediastinal silhouette is normal.  The lungs are clear. There are no   significant pleural effusions. The bony structures and soft tissues are unremarkable.       --------------             Medications given in the ED-  Medications - No data to display      Medical Decision Making   I am the first provider for this patient. I reviewed the vital signs, available nursing notes, past medical history, past surgical history, family history and social history. Vital Signs-Reviewed the patient's vital signs. Pulse Oximetry Analysis - 95% on RA     Cardiac Monitor:  Rate: 90 bpm  Rhythm: NSR    EKG interpretation: (Preliminary)  9:26 PM   Rate 90 BPM. NSR. Stable compared to 12/2008. No STEMI  EKG read by Vero Sanchez PA-C at 21:30     Provider Notes (Medical Decision Making):     Procedures:  Procedures    ED Course:   9:51 PM Initial assessment performed. The patients presenting problems have been discussed, and they are in agreement with the care plan formulated and outlined with them. I have encouraged them to ask questions as they arise throughout their visit.     Diagnosis and Disposition       DISCHARGE NOTE:  9:59 PM  Angel Luis Ribeiro's  results have been reviewed with her. She has been counseled regarding her diagnosis, treatment, and plan. She verbally conveys understanding and agreement of the signs, symptoms, diagnosis, treatment and prognosis and additionally agrees to follow up as discussed. She also agrees with the care-plan and conveys that all of her questions have been answered. I have also provided discharge instructions for her that include: educational information regarding their diagnosis and treatment, and list of reasons why they would want to return to the ED prior to their follow-up appointment, should her condition change. She has been provided with education for proper emergency department utilization. CLINICAL IMPRESSION:    1. Acute bronchitis, unspecified organism    2. Asthma with acute exacerbation in adult, unspecified asthma severity, unspecified whether persistent        PLAN:  1. D/C Home  2. Discharge Medication List as of 2/7/2018  9:58 PM      START taking these medications    Details   azithromycin (ZITHROMAX Z-JEREMY) 250 mg tablet Take as package directs, Print, Disp-6 Tab, R-0      methylPREDNISolone (MEDROL, JEREMY,) 4 mg tablet Take as package directs, Print, Disp-1 Dose Pack, R-0      promethazine-phenyleph-codeine (PHENERGAN VC WITH CODEINE) 6.25-5-10 mg/5 mL syrp Take 5 mL by mouth every six (6) hours as needed. Max Daily Amount: 20 mL., Print, Disp-118 mL, R-0         CONTINUE these medications which have NOT CHANGED    Details   cholecalciferol, vitamin D3, (VITAMIN D3) 2,000 unit tab Take  by mouth., Historical Med      propranolol LA (INDERAL LA) 80 mg SR capsule Take 40 mg by mouth two (2) times a day., Historical Med      levothyroxine (SYNTHROID) 200 mcg tablet Take 225 mcg by mouth Daily (before breakfast). , Historical Med      ferrous sulfate (IRON) 325 mg (65 mg iron) tablet Take  by mouth Daily (before breakfast). , Historical Med      ondansetron hcl (ZOFRAN, AS HYDROCHLORIDE,) 4 mg tablet Take 1 Tab by mouth every eight (8) hours as needed for Nausea. , Print, Disp-15 Tab, R-0      ketorolac (TORADOL) 10 mg tablet Take 1 Tab by mouth every six (6) hours as needed for Pain., Print, Disp-20 Tab, R-0           3. Follow-up Information     Follow up With Details Comments Contact Info    [facility] Schedule an appointment as soon as possible for a visit in 2 days  [address] [facility], [address] [facility] [address]    [facility] EMERGENCY DEPT  As needed, If symptoms worsen [address] [address]  [phone]        _______________________________    Attestations: This note is prepared by Tristan Gonzáles , acting as Scribe for [employer] [facility] [employer].:  The scribe's documentation has been prepared under my direction and personally reviewed by me in its entirety.   I confirm that the note above accurately reflects all work, treatment, procedures, and medical decision making performed by me.  _______________________________

## 2018-02-08 NOTE — DISCHARGE INSTRUCTIONS
Bronchitis: Care Instructions  Your Care Instructions    Bronchitis is inflammation of the bronchial tubes, which carry air to the lungs. The tubes swell and produce mucus, or phlegm. The mucus and inflamed bronchial tubes make you cough. You may have trouble breathing. Most cases of bronchitis are caused by viruses like those that cause colds. Antibiotics usually do not help and they may be harmful. Bronchitis usually develops rapidly and lasts about 2 to 3 weeks in otherwise healthy people. Follow-up care is a key part of your treatment and safety. Be sure to make and go to all appointments, and call your doctor if you are having problems. It's also a good idea to know your test results and keep a list of the medicines you take. How can you care for yourself at home? · Take all medicines exactly as prescribed. Call your doctor if you think you are having a problem with your medicine. · Get some extra rest.  · Take an over-the-counter pain medicine, such as acetaminophen (Tylenol), ibuprofen (Advil, Motrin), or naproxen (Aleve) to reduce fever and relieve body aches. Read and follow all instructions on the label. · Do not take two or more pain medicines at the same time unless the doctor told you to. Many pain medicines have acetaminophen, which is Tylenol. Too much acetaminophen (Tylenol) can be harmful. · Take an over-the-counter cough medicine that contains dextromethorphan to help quiet a dry, hacking cough so that you can sleep. Avoid cough medicines that have more than one active ingredient. Read and follow all instructions on the label. · Breathe moist air from a humidifier, hot shower, or sink filled with hot water. The heat and moisture will thin mucus so you can cough it out. · Do not smoke. Smoking can make bronchitis worse. If you need help quitting, talk to your doctor about stop-smoking programs and medicines. These can increase your chances of quitting for good.   When should you call for help? Call 911 anytime you think you may need emergency care. For example, call if:  ? · You have severe trouble breathing. ?Call your doctor now or seek immediate medical care if:  ? · You have new or worse trouble breathing. ? · You cough up dark brown or bloody mucus (sputum). ? · You have a new or higher fever. ? · You have a new rash. ? Watch closely for changes in your health, and be sure to contact your doctor if:  ? · You cough more deeply or more often, especially if you notice more mucus or a change in the color of your mucus. ? · You are not getting better as expected. Where can you learn more? Go to http://bairon-yamel.info/. Enter H333 in the search box to learn more about \"Bronchitis: Care Instructions. \"  Current as of: May 12, 2017  Content Version: 11.4  © 8899-2745 [employer]. Care instructions adapted under license by [employer] (which disclaims liability or warranty for this information). If you have questions about a medical condition or this instruction, always ask your healthcare professional. [address] any warranty or liability for your use of this information.

## 2018-02-11 LAB
ATRIAL RATE: 90 BPM
CALCULATED P AXIS, ECG09: 54 DEGREES
CALCULATED R AXIS, ECG10: 6 DEGREES
CALCULATED T AXIS, ECG11: 0 DEGREES
DIAGNOSIS, 93000: NORMAL
P-R INTERVAL, ECG05: 182 MS
Q-T INTERVAL, ECG07: 380 MS
QRS DURATION, ECG06: 102 MS
QTC CALCULATION (BEZET), ECG08: 464 MS
VENTRICULAR RATE, ECG03: 90 BPM

## 2018-04-14 ENCOUNTER — HOSPITAL ENCOUNTER (EMERGENCY)
Age: 28
Discharge: HOME | End: 2018-04-14
Attending: INTERNAL MEDICINE
Payer: SELF-PAY

## 2018-04-14 VITALS
SYSTOLIC BLOOD PRESSURE: 170 MMHG | WEIGHT: 280 LBS | RESPIRATION RATE: 16 BRPM | OXYGEN SATURATION: 98 % | HEART RATE: 70 BPM | BODY MASS INDEX: 51.53 KG/M2 | TEMPERATURE: 98.2 F | HEIGHT: 62 IN | DIASTOLIC BLOOD PRESSURE: 107 MMHG

## 2018-04-14 DIAGNOSIS — S02.5XXA CLOSED FRACTURE OF TOOTH, INITIAL ENCOUNTER: ICD-10-CM

## 2018-04-14 DIAGNOSIS — K02.9 PAIN DUE TO DENTAL CARIES: Primary | ICD-10-CM

## 2018-04-14 DIAGNOSIS — I10 ESSENTIAL HYPERTENSION: ICD-10-CM

## 2018-04-14 PROCEDURE — 74011250637 HC RX REV CODE- 250/637: Performed by: INTERNAL MEDICINE

## 2018-04-14 PROCEDURE — 99283 EMERGENCY DEPT VISIT LOW MDM: CPT

## 2018-04-14 RX ORDER — ACETAMINOPHEN AND CODEINE PHOSPHATE 300; 30 MG/1; MG/1
1 TABLET ORAL
Qty: 12 TAB | Refills: 0 | Status: SHIPPED | OUTPATIENT
Start: 2018-04-14

## 2018-04-14 RX ORDER — AMOXICILLIN 500 MG/1
500 TABLET, FILM COATED ORAL 3 TIMES DAILY
Qty: 30 TAB | Refills: 0 | Status: SHIPPED | OUTPATIENT
Start: 2018-04-14

## 2018-04-14 RX ORDER — ACETAMINOPHEN 500 MG
1000 TABLET ORAL
Status: COMPLETED | OUTPATIENT
Start: 2018-04-14 | End: 2018-04-14

## 2018-04-14 RX ADMIN — ACETAMINOPHEN 1000 MG: 500 TABLET ORAL at 10:07

## 2018-04-14 NOTE — Clinical Note
Dr. Gloria Sunshine, [address] 
[phone] [facility]: 
[address] [place] [facility], 101 [facility] 
[phone] Fillings, Cleanings, and Extractions [address] [facility] [address], [address] 
[phone] Fillings, Cleanings, and Extractions [facility] [address] 
[phone] Fillings, Cleaning, and Extractions [facility] Department [address] [facility] [address], 3947 [facility] 
[phone] [address], [address] [place] [address] Road 
[phone] Ages 3-18, if attending [facility] 450 [facility] 
201 [facility], [address] [facility] [address] 
[phone] Extractions, Fillings, C jvai [facility], [address] [facility] [address] 
[phone] Oral Surgery - $70 required Cleanings, Fillings, Extractions - $100 Required [address],  [address] 
[phone] [employer] Only [address] [facility] [employer] and [address], [address] [facility] Clinic Open September to June

## 2018-04-14 NOTE — ED NOTES
Patient tolerated PO rx Tylenol, see [facility]    Discharge instructions reviewed with the patient with opportunity for questions given. The patient verbalized understanding. Patient armband removed and shredded. Patient in stable condition at time of discharge.

## 2018-04-14 NOTE — ED PROVIDER NOTES
EMERGENCY DEPARTMENT HISTORY AND PHYSICAL EXAM    Date: 4/14/2018  Patient Name: Maile Ribeiro    History of Presenting Illness     Chief Complaint   Patient presents with    Dental Pain         History Provided By: Patient    Chief Complaint: Dental Pain  Duration: few month and worsening today  Timing:  Intermittent  Location: Right upper dental pain  Severity: 7 out of 10  Modifying Factors: None  Associated Symptoms: denies any other associated signs or symptoms    Additional History (Context):   8:59 AM  Kvng Gallegos is a 32 y.o. female with PMHX HTN, HA, seizure, and hypothyroidism presents to the emergency department C/O intermittent right upper dental pain, onset a few months ago and worsening this morning. Associated sxs include gum swelling. Has not tried anything for her pain. Pt states she has two cracked teeth in the back, but has not followed up with a dentist. Last dental appointment 9 months ago. Pt has not taken her daily medications today. Pt denies PMHX DM, fever, chills, CP, SOB, n/v/d, and any other sxs or complaints. PCP: Abdoulaye Zuniga MD    Current Outpatient Prescriptions   Medication Sig Dispense Refill    acetaminophen-codeine (TYLENOL-CODEINE #3) 300-30 mg per tablet Take 1 Tab by mouth every six (6) hours as needed for Pain. Max Daily Amount: 4 Tabs. Caution can cause drowsiness 12 Tab 0    amoxicillin 500 mg tab Take 500 mg by mouth three (3) times daily. 30 Tab 0    cholecalciferol, vitamin D3, (VITAMIN D3) 2,000 unit tab Take  by mouth.  propranolol LA (INDERAL LA) 80 mg SR capsule Take 40 mg by mouth two (2) times a day.  levothyroxine (SYNTHROID) 200 mcg tablet Take 225 mcg by mouth Daily (before breakfast).  ferrous sulfate (IRON) 325 mg (65 mg iron) tablet Take  by mouth Daily (before breakfast).          Past History     Past Medical History:  Past Medical History:   Diagnosis Date    Anemia     Headache(784.0)     Hypertension     Hypothyroidism     Seizure [facility])        Past Surgical History:  Past Surgical History:   Procedure Laterality Date    HX ORTHOPAEDIC      right meniscus repair       Family History:  No family history on file. Social History:  Social History   Substance Use Topics    Smoking status: Current Every Day Smoker     Packs/day: 1.00    Smokeless tobacco: Never Used    Alcohol use No       Allergies: Allergies   Allergen Reactions    Hydrochlorothiazide Swelling    Tramadol Rash         Review of Systems   Review of Systems   Constitutional: Negative for chills and fever. HENT: Positive for dental problem. Respiratory: Negative for shortness of breath. Cardiovascular: Negative for chest pain. Gastrointestinal: Negative for diarrhea, nausea and vomiting. All other systems reviewed and are negative. Physical Exam     Vitals:    04/14/18 0838   BP: (!) 170/107   Pulse: 70   Resp: 16   Temp: 98.2 °F (36.8 °C)   SpO2: 98%   Weight: 127 kg (280 lb)   Height: 5' 2\" (1.575 m)     Physical Exam   Constitutional: She is oriented to person, place, and time. She appears well-developed and well-nourished. HENT:   Head: Normocephalic and atraumatic. Right Ear: External ear normal.   Left Ear: External ear normal.   Nose: Nose normal.   Mouth/Throat: Oropharynx is clear and moist and mucous membranes are normal. Abnormal dentition. Dental caries present. Partially fractured right upper molar. No drainage or bleed. Some gum swelling and bleeding next to it. Dental caries mostly in the upper and lower molars. Eyes: Conjunctivae and EOM are normal. Pupils are equal, round, and reactive to light. Right eye exhibits no discharge. Left eye exhibits no discharge. No scleral icterus. Neck: Normal range of motion. Neck supple. No JVD present. No tracheal deviation present. Cardiovascular: Normal rate, regular rhythm, normal heart sounds and intact distal pulses.     Pulmonary/Chest: Effort normal and breath sounds normal. No respiratory distress. Abdominal: Soft. Bowel sounds are normal. She exhibits no distension. There is no tenderness. No HSM   Musculoskeletal: Normal range of motion. Neurological: She is alert and oriented to person, place, and time. She has normal reflexes. No focal motor weakness. Skin: Skin is warm and dry. No rash noted. Psychiatric: She has a normal mood and affect. Her behavior is normal.   Nursing note and vitals reviewed. Diagnostic Study Results     Labs -   No results found for this or any previous visit (from the past 12 hour(s)). Radiologic Studies -   No orders to display     CT Results  (Last 48 hours)    None        CXR Results  (Last 48 hours)    None          MEDICATIONS GIVEN:  Medications - No data to display     Medical Decision Making   I am the first provider for this patient. I reviewed the vital signs, available nursing notes, past medical history, past surgical history, family history and social history. Vital Signs-Reviewed the patient's vital signs. Pulse Oximetry Analysis - 98% on RA     Records Reviewed: Nursing Notes    Provider Notes (Medical Decision Making):     Procedures:  Procedures    ED Course:   8:59 AM Initial assessment performed. The patients presenting problems have been discussed, and they are in agreement with the care plan formulated and outlined with them. I have encouraged them to ask questions as they arise throughout their visit. Diagnosis and Disposition       DISCHARGE NOTE:  9:05 AM  Angel Luis Ribeiro's  results have been reviewed with her. She has been counseled regarding her diagnosis, treatment, and plan. She verbally conveys understanding and agreement of the signs, symptoms, diagnosis, treatment and prognosis and additionally agrees to follow up as discussed. She also agrees with the care-plan and conveys that all of her questions have been answered.   I have also provided discharge instructions for her that include: educational information regarding their diagnosis and treatment, and list of reasons why they would want to return to the ED prior to their follow-up appointment, should her condition change. She has been provided with education for proper emergency department utilization. CLINICAL IMPRESSION:    1. Pain due to dental caries    2. Closed fracture of tooth, initial encounter    3. Essential hypertension        PLAN:  1. D/C Home  2. Current Discharge Medication List      START taking these medications    Details   acetaminophen-codeine (TYLENOL-CODEINE #3) 300-30 mg per tablet Take 1 Tab by mouth every six (6) hours as needed for Pain. Max Daily Amount: 4 Tabs. Caution can cause drowsiness  Qty: 12 Tab, Refills: 0    Associated Diagnoses: Closed fracture of tooth, initial encounter; Pain due to dental caries      amoxicillin 500 mg tab Take 500 mg by mouth three (3) times daily. Qty: 30 Tab, Refills: 0           3. Follow-up Information     Follow up With Details Comments [address] [facility] Schedule an appointment as soon as possible for a visit in 2 days  [address] [place] Villegas [address] [facility] EMERGENCY DEPT  As needed, if symptoms worsen [address] [place] [address]  [phone]          _______________________________   Attestations:     SCRIBE ATTESTATION:  This note is prepared by Monserrat Aquino, acting as Scribe for Zuleima Pat MD.    PROVIDER ATTESTATION:  Zuleima Pat MD: The scribe's documentation has been prepared under my direction and personally reviewed by me in its entirety.  I confirm that the note above accurately reflects all work, treatment, procedures, and medical decision making performed by me.   _______________________________

## 2018-04-14 NOTE — DISCHARGE INSTRUCTIONS
Dental Pain: After Your Visit  Your Care Instructions  The most common cause of dental pain is tooth decay. It can also be caused by an infection of the tooth (abscess) or gum, a tooth that has not broken all the way through the gum (impacted tooth), or a problem with the nerve-filled center of the tooth. Follow-up care is a key part of your treatment and safety. Be sure to make and go to all appointments, and call your doctor if you are having problems. Its also a good idea to know your test results and keep a list of the medicines you take. How can you care for yourself at home? · Contact a dentist for follow-up care. · Put ice or a cold pack on the outside of your mouth for 10 to 20 minutes at a time to reduce pain and swelling. Put a thin cloth between the ice and your skin. · Take an over-the-counter pain medicine, such as acetaminophen (Tylenol), ibuprofen (Advil, Motrin), or naproxen (Aleve). Read and follow all instructions on the label. · Do not take two or more pain medicines at the same time unless the doctor told you to. Many pain medicines have acetaminophen, which is Tylenol. Too much acetaminophen (Tylenol) can be harmful. · Rinse your mouth with warm salt water every 2 hours to help relieve pain and swelling from an infected tooth. Mix 1 teaspoon of salt in 8 ounces of water. · If your doctor prescribed antibiotics, take them as directed. Do not stop taking them just because you feel better. You need to take the full course of antibiotics. When should you call for help? Call your doctor now or seek immediate medical care if:  · You have signs of infection, such as:  ¨ Increased pain, swelling, warmth, or redness. ¨ Pus draining from the gum, tooth, or face. ¨ A fever. Watch closely for changes in your health, and be sure to contact your doctor if:  · You do not get better as expected. Where can you learn more?    Go to [employer]  Enter K364 in the search box to learn more about \"Dental Pain: After Your Visit. \"   © 0398-3081 [employer]. Care instructions adapted under license by [employer] (which disclaims liability or warranty for this information). This care instruction is for use with your licensed healthcare professional. If you have questions about a medical condition or this instruction, always ask your healthcare professional. [address] any warranty or liability for your use of this information. Content Version: 17.6.791699;  Last Revised: May 17, 2013

## 2018-04-14 NOTE — ED TRIAGE NOTES
Pt states dental pain that started yesterday, woke up this am with right facial swelling;  Right upper dental pain states she has had 2 broken teeth for awhile

## 2024-07-08 ENCOUNTER — HOSPITAL ENCOUNTER (EMERGENCY)
Facility: HOSPITAL | Age: 34
Discharge: HOME | End: 2024-07-09
Payer: COMMERCIAL

## 2024-07-08 DIAGNOSIS — R10.9 FLANK PAIN: Primary | ICD-10-CM

## 2024-07-08 LAB — HCG UR QL: NEGATIVE

## 2024-07-08 PROCEDURE — 81025 URINE PREGNANCY TEST: CPT

## 2024-07-08 PROCEDURE — 81003 URINALYSIS AUTO W/O SCOPE: CPT

## 2024-07-08 PROCEDURE — 96374 THER/PROPH/DIAG INJ IV PUSH: CPT

## 2024-07-08 PROCEDURE — 99284 EMERGENCY DEPT VISIT MOD MDM: CPT

## 2024-07-08 PROCEDURE — 93005 ELECTROCARDIOGRAM TRACING: CPT | Performed by: NURSE PRACTITIONER

## 2024-07-08 PROCEDURE — 85025 COMPLETE CBC W/AUTO DIFF WBC: CPT

## 2024-07-08 PROCEDURE — 83690 ASSAY OF LIPASE: CPT

## 2024-07-08 PROCEDURE — 80053 COMPREHEN METABOLIC PANEL: CPT

## 2024-07-08 PROCEDURE — 6360000002 HC RX W HCPCS: Performed by: NURSE PRACTITIONER

## 2024-07-08 RX ORDER — KETOROLAC TROMETHAMINE 15 MG/ML
15 INJECTION, SOLUTION INTRAMUSCULAR; INTRAVENOUS
Status: COMPLETED | OUTPATIENT
Start: 2024-07-09 | End: 2024-07-08

## 2024-07-08 RX ADMIN — KETOROLAC TROMETHAMINE 15 MG: 15 INJECTION, SOLUTION INTRAMUSCULAR; INTRAVENOUS at 23:54

## 2024-07-08 ASSESSMENT — PAIN SCALES - GENERAL: PAINLEVEL_OUTOF10: 6

## 2024-07-08 ASSESSMENT — PAIN DESCRIPTION - LOCATION: LOCATION: FLANK

## 2024-07-09 ENCOUNTER — APPOINTMENT (OUTPATIENT)
Facility: HOSPITAL | Age: 34
End: 2024-07-09
Payer: COMMERCIAL

## 2024-07-09 VITALS
BODY MASS INDEX: 47.84 KG/M2 | TEMPERATURE: 97.7 F | DIASTOLIC BLOOD PRESSURE: 85 MMHG | HEART RATE: 80 BPM | OXYGEN SATURATION: 98 % | WEIGHT: 260 LBS | HEIGHT: 62 IN | SYSTOLIC BLOOD PRESSURE: 136 MMHG | RESPIRATION RATE: 16 BRPM

## 2024-07-09 LAB
ALBUMIN SERPL-MCNC: 3.3 G/DL (ref 3.4–5)
ALBUMIN/GLOB SERPL: 0.8 (ref 0.8–1.7)
ALP SERPL-CCNC: 66 U/L (ref 45–117)
ALT SERPL-CCNC: 22 U/L (ref 13–56)
ANION GAP SERPL CALC-SCNC: 6 MMOL/L (ref 3–18)
APPEARANCE UR: CLEAR
AST SERPL-CCNC: 10 U/L (ref 10–38)
BASOPHILS # BLD: 0 K/UL (ref 0–0.1)
BASOPHILS NFR BLD: 0 % (ref 0–2)
BILIRUB SERPL-MCNC: 0.2 MG/DL (ref 0.2–1)
BILIRUB UR QL: NEGATIVE
BUN SERPL-MCNC: 17 MG/DL (ref 7–18)
BUN/CREAT SERPL: 20 (ref 12–20)
CALCIUM SERPL-MCNC: 8.8 MG/DL (ref 8.5–10.1)
CHLORIDE SERPL-SCNC: 105 MMOL/L (ref 100–111)
CO2 SERPL-SCNC: 25 MMOL/L (ref 21–32)
COLOR UR: YELLOW
CREAT SERPL-MCNC: 0.83 MG/DL (ref 0.6–1.3)
DIFFERENTIAL METHOD BLD: ABNORMAL
EKG ATRIAL RATE: 83 BPM
EKG DIAGNOSIS: NORMAL
EKG P AXIS: 44 DEGREES
EKG P-R INTERVAL: 170 MS
EKG Q-T INTERVAL: 374 MS
EKG QRS DURATION: 94 MS
EKG QTC CALCULATION (BAZETT): 439 MS
EKG R AXIS: -7 DEGREES
EKG T AXIS: -5 DEGREES
EKG VENTRICULAR RATE: 83 BPM
EOSINOPHIL # BLD: 0.3 K/UL (ref 0–0.4)
EOSINOPHIL NFR BLD: 2 % (ref 0–5)
ERYTHROCYTE [DISTWIDTH] IN BLOOD BY AUTOMATED COUNT: 14.9 % (ref 11.6–14.5)
GLOBULIN SER CALC-MCNC: 4.3 G/DL (ref 2–4)
GLUCOSE SERPL-MCNC: 100 MG/DL (ref 74–99)
GLUCOSE UR STRIP.AUTO-MCNC: NEGATIVE MG/DL
HCT VFR BLD AUTO: 34.5 % (ref 35–45)
HGB BLD-MCNC: 11.1 G/DL (ref 12–16)
HGB UR QL STRIP: NEGATIVE
IMM GRANULOCYTES # BLD AUTO: 0.2 K/UL (ref 0–0.04)
IMM GRANULOCYTES NFR BLD AUTO: 1 % (ref 0–0.5)
KETONES UR QL STRIP.AUTO: NEGATIVE MG/DL
LEUKOCYTE ESTERASE UR QL STRIP.AUTO: NEGATIVE
LIPASE SERPL-CCNC: 29 U/L (ref 13–75)
LYMPHOCYTES # BLD: 3.3 K/UL (ref 0.9–3.6)
LYMPHOCYTES NFR BLD: 26 % (ref 21–52)
MCH RBC QN AUTO: 28.2 PG (ref 24–34)
MCHC RBC AUTO-ENTMCNC: 32.2 G/DL (ref 31–37)
MCV RBC AUTO: 87.8 FL (ref 78–100)
MONOCYTES # BLD: 0.8 K/UL (ref 0.05–1.2)
MONOCYTES NFR BLD: 6 % (ref 3–10)
NEUTS SEG # BLD: 7.9 K/UL (ref 1.8–8)
NEUTS SEG NFR BLD: 64 % (ref 40–73)
NITRITE UR QL STRIP.AUTO: NEGATIVE
NRBC # BLD: 0 K/UL (ref 0–0.01)
NRBC BLD-RTO: 0 PER 100 WBC
PH UR STRIP: 6.5 (ref 5–8)
PLATELET # BLD AUTO: 263 K/UL (ref 135–420)
PMV BLD AUTO: 11 FL (ref 9.2–11.8)
POTASSIUM SERPL-SCNC: 3.4 MMOL/L (ref 3.5–5.5)
PROT SERPL-MCNC: 7.6 G/DL (ref 6.4–8.2)
PROT UR STRIP-MCNC: NEGATIVE MG/DL
RBC # BLD AUTO: 3.93 M/UL (ref 4.2–5.3)
SODIUM SERPL-SCNC: 136 MMOL/L (ref 136–145)
SP GR UR REFRACTOMETRY: 1.02 (ref 1–1.03)
UROBILINOGEN UR QL STRIP.AUTO: 0.2 EU/DL (ref 0.2–1)
WBC # BLD AUTO: 12.4 K/UL (ref 4.6–13.2)

## 2024-07-09 PROCEDURE — 74176 CT ABD & PELVIS W/O CONTRAST: CPT

## 2024-07-09 RX ORDER — IBUPROFEN 800 MG/1
800 TABLET ORAL EVERY 8 HOURS PRN
Qty: 30 TABLET | Refills: 0 | Status: SHIPPED | OUTPATIENT
Start: 2024-07-09 | End: 2024-07-19

## 2024-07-09 NOTE — ED PROVIDER NOTES
[facility] EMERGENCY DEPT  EMERGENCY DEPARTMENT ENCOUNTER       Pt Name: Willie Zhang  MRN: [id number]  Birthdate 1990  Date of evaluation: 7/8/2024  PCP: Karine Vegas APRN - NP  Note Started: 1:48 AM 7/8/24     CHIEF COMPLAINT       Chief Complaint   Patient presents with    Flank Pain        HISTORY OF PRESENT ILLNESS: 1 or more elements      History From: Patient  HPI Limitations: None  Chronic Conditions: Hypertension, hypothyroidism, morbid obesity, anxiety  Social Determinants affecting Dx or Tx: none      Willie Zhang is a 34 y.o. female with history of hypertension who presents to ED c/o right flank pain x 3 days.  Patient describes pain as dull and initially intermittent without noted aggravating or alleviating factors, patient now states is constant.  Patient also notes dark urine and urinary frequency.  Patient denies measured fever/chills, nausea/vomiting, abdominal pain, headache, dizziness, chest pain or shortness of breath, focal weakness or paresthesias, dysuria, urinary urgency, hematuria, vaginal itching discharge or discomfort.     Nursing Notes were all reviewed and agreed with or any disagreements were addressed in the HPI.    PAST HISTORY     Past Medical History:  History reviewed. No pertinent past medical history.    Past Surgical History:  History reviewed. No pertinent surgical history.    Family History:  History reviewed. No pertinent family history.    Social History:  Social History     Socioeconomic History    Marital status:      Spouse name: None    Number of children: None    Years of education: None    Highest education level: None       Allergies:  No Known Allergies    CURRENT MEDICATIONS      No current facility-administered medications for this encounter.     Current Outpatient Medications   Medication Sig Dispense Refill    ibuprofen (ADVIL;MOTRIN) 800 MG tablet Take 1 tablet by mouth every 8 hours as needed for Pain 30 tablet 0          PHYSICAL EXAM

## 2024-07-09 NOTE — ED TRIAGE NOTES
Pt presents to ED with c/o right flank pain, urinary frequency and dark urine x5 days. Denies fever, N/V/D.     A&OX4, ambulatory to triage.